# Patient Record
Sex: FEMALE | Race: WHITE | NOT HISPANIC OR LATINO | Employment: FULL TIME | URBAN - METROPOLITAN AREA
[De-identification: names, ages, dates, MRNs, and addresses within clinical notes are randomized per-mention and may not be internally consistent; named-entity substitution may affect disease eponyms.]

---

## 2024-02-19 SDOH — HEALTH STABILITY: PHYSICAL HEALTH: ON AVERAGE, HOW MANY MINUTES DO YOU ENGAGE IN EXERCISE AT THIS LEVEL?: 30 MIN

## 2024-02-19 SDOH — HEALTH STABILITY: PHYSICAL HEALTH: ON AVERAGE, HOW MANY DAYS PER WEEK DO YOU ENGAGE IN MODERATE TO STRENUOUS EXERCISE (LIKE A BRISK WALK)?: 3 DAYS

## 2024-02-19 ASSESSMENT — SOCIAL DETERMINANTS OF HEALTH (SDOH): HOW OFTEN DO YOU GET TOGETHER WITH FRIENDS OR RELATIVES?: TWICE A WEEK

## 2024-02-26 ENCOUNTER — OFFICE VISIT (OUTPATIENT)
Dept: FAMILY MEDICINE | Facility: CLINIC | Age: 24
End: 2024-02-26
Payer: COMMERCIAL

## 2024-02-26 ENCOUNTER — TELEPHONE (OUTPATIENT)
Dept: FAMILY MEDICINE | Facility: CLINIC | Age: 24
End: 2024-02-26

## 2024-02-26 VITALS
DIASTOLIC BLOOD PRESSURE: 68 MMHG | HEIGHT: 67 IN | OXYGEN SATURATION: 100 % | HEART RATE: 82 BPM | RESPIRATION RATE: 16 BRPM | TEMPERATURE: 97.6 F | WEIGHT: 119.4 LBS | BODY MASS INDEX: 18.74 KG/M2 | SYSTOLIC BLOOD PRESSURE: 108 MMHG

## 2024-02-26 DIAGNOSIS — Z00.00 ROUTINE GENERAL MEDICAL EXAMINATION AT A HEALTH CARE FACILITY: Primary | ICD-10-CM

## 2024-02-26 DIAGNOSIS — Z87.448 HISTORY OF PYELONEPHRITIS: ICD-10-CM

## 2024-02-26 DIAGNOSIS — N15.1 RENAL ABSCESS, RIGHT: ICD-10-CM

## 2024-02-26 DIAGNOSIS — Z11.59 NEED FOR HEPATITIS C SCREENING TEST: ICD-10-CM

## 2024-02-26 DIAGNOSIS — Z30.41 ENCOUNTER FOR BIRTH CONTROL PILLS MAINTENANCE: ICD-10-CM

## 2024-02-26 DIAGNOSIS — K64.4 EXTERNAL HEMORRHOIDS: ICD-10-CM

## 2024-02-26 PROBLEM — M25.562 CHRONIC PAIN OF BOTH KNEES: Status: ACTIVE | Noted: 2020-01-07

## 2024-02-26 PROBLEM — G89.29 CHRONIC PAIN OF BOTH KNEES: Status: ACTIVE | Noted: 2020-01-07

## 2024-02-26 PROBLEM — I73.00 RAYNAUD'S PHENOMENON WITHOUT GANGRENE: Status: ACTIVE | Noted: 2023-11-24

## 2024-02-26 PROBLEM — G43.109 MIGRAINE WITH AURA AND WITHOUT STATUS MIGRAINOSUS, NOT INTRACTABLE: Status: ACTIVE | Noted: 2024-02-26

## 2024-02-26 PROBLEM — M25.561 CHRONIC PAIN OF BOTH KNEES: Status: ACTIVE | Noted: 2020-01-07

## 2024-02-26 LAB
ALBUMIN UR-MCNC: NEGATIVE MG/DL
APPEARANCE UR: CLEAR
BACTERIA #/AREA URNS HPF: ABNORMAL /HPF
BILIRUB UR QL STRIP: NEGATIVE
COLOR UR AUTO: YELLOW
ERYTHROCYTE [DISTWIDTH] IN BLOOD BY AUTOMATED COUNT: 11.7 % (ref 10–15)
GLUCOSE UR STRIP-MCNC: NEGATIVE MG/DL
HCT VFR BLD AUTO: 41.7 % (ref 35–47)
HCV AB SERPL QL IA: NONREACTIVE
HGB BLD-MCNC: 13.5 G/DL (ref 11.7–15.7)
HGB UR QL STRIP: NEGATIVE
KETONES UR STRIP-MCNC: NEGATIVE MG/DL
LEUKOCYTE ESTERASE UR QL STRIP: NEGATIVE
MCH RBC QN AUTO: 31.9 PG (ref 26.5–33)
MCHC RBC AUTO-ENTMCNC: 32.4 G/DL (ref 31.5–36.5)
MCV RBC AUTO: 99 FL (ref 78–100)
MUCOUS THREADS #/AREA URNS LPF: PRESENT /LPF
NITRATE UR QL: NEGATIVE
PH UR STRIP: 6 [PH] (ref 5–7)
PLATELET # BLD AUTO: 289 10E3/UL (ref 150–450)
RBC # BLD AUTO: 4.23 10E6/UL (ref 3.8–5.2)
RBC #/AREA URNS AUTO: ABNORMAL /HPF
SP GR UR STRIP: 1.02 (ref 1–1.03)
SQUAMOUS #/AREA URNS AUTO: ABNORMAL /LPF
UROBILINOGEN UR STRIP-ACNC: 0.2 E.U./DL
WBC # BLD AUTO: 5.3 10E3/UL (ref 4–11)
WBC #/AREA URNS AUTO: ABNORMAL /HPF

## 2024-02-26 PROCEDURE — 36415 COLL VENOUS BLD VENIPUNCTURE: CPT | Performed by: PHYSICIAN ASSISTANT

## 2024-02-26 PROCEDURE — 86803 HEPATITIS C AB TEST: CPT | Performed by: PHYSICIAN ASSISTANT

## 2024-02-26 PROCEDURE — 81001 URINALYSIS AUTO W/SCOPE: CPT | Performed by: PHYSICIAN ASSISTANT

## 2024-02-26 PROCEDURE — 99385 PREV VISIT NEW AGE 18-39: CPT | Performed by: PHYSICIAN ASSISTANT

## 2024-02-26 PROCEDURE — 85027 COMPLETE CBC AUTOMATED: CPT | Performed by: PHYSICIAN ASSISTANT

## 2024-02-26 PROCEDURE — 99213 OFFICE O/P EST LOW 20 MIN: CPT | Mod: 25 | Performed by: PHYSICIAN ASSISTANT

## 2024-02-26 RX ORDER — ACETAMINOPHEN AND CODEINE PHOSPHATE 120; 12 MG/5ML; MG/5ML
0.35 SOLUTION ORAL
COMMUNITY
Start: 2022-09-19 | End: 2024-02-26

## 2024-02-26 RX ORDER — ACETAMINOPHEN AND CODEINE PHOSPHATE 120; 12 MG/5ML; MG/5ML
0.35 SOLUTION ORAL DAILY
Qty: 84 TABLET | Refills: 3 | Status: SHIPPED | OUTPATIENT
Start: 2024-02-26

## 2024-02-26 ASSESSMENT — PAIN SCALES - GENERAL: PAINLEVEL: MILD PAIN (2)

## 2024-02-26 NOTE — PROGRESS NOTES
Preventive Care Visit  [facility]  NICOLASA Barlow, Physician Assistant - Medical  Feb 26, 2024    Assessment & Plan     Routine general medical examination at a health care facility  Repeat 1 year     Need for hepatitis C screening test  - Hepatitis C Screen Reflex to HCV RNA Quant and Genotype; Future    Encounter for birth control pills maintenance  - norethindrone (MICRONOR) 0.35 MG tablet; Take 1 tablet (0.35 mg) by mouth daily    Renal abscess, right  Hx of renal abscess and pyelonephritis in 12/23 which resolved about 1 month ago on U/S however patient it having similar symptoms again. Advised labs and repeat U/S. Warning signs to return to ER educated to patient. Patient agree's with this plan and has no further questions  - UA with Microscopic reflex to Culture - lab collect; Future  - CBC with platelets; Future  - US Kidney Right; Future    History of pyelonephritis  See plan above  - UA with Microscopic reflex to Culture - lab collect; Future  - CBC with platelets; Future  - US Kidney Right; Future    External hemorrhoids  Conservative treatment recommended today. Advised follow up as needed    Patient has been advised of split billing requirements and indicates understanding: Yes  Review of prior external note(s) from - Outside records from [facility] - [place] and [place]        Counseling  Appropriate preventive services were discussed with this patient, including applicable screening as appropriate for fall prevention, nutrition, physical activity, Tobacco-use cessation, weight loss and cognition.  Checklist reviewing preventive services available has been given to the patient.  Reviewed patient's diet, addressing concerns and/or questions.   She is at risk for lack of exercise and has been provided with information to increase physical activity for the benefit of her well-being.           Natasha Means is a 23 year old, presenting for the  following:  Physical       Via the Health Maintenance questionnaire, the patient has reported the following services have been completed -Chlamydia-Cervical Cancer Screening, this information has been sent to the abstraction team.  Health Care Directive  Patient does not have a Health Care Directive or Living Will: Discussed advance care planning with patient; however, patient declined at this time.    HPI  Kidney infection - Hx hospitalized for a kidney infection and renal abscess on the right kidney at the end of 2023. She had  antibiotic for 3 weeks, Levofloxacin. U/S was repeated to ensure the abscessed resolved on antibiotics. It did but now patient is having similar pain in the right side. No urinary symptoms. No fevers, chills, or body aches.    Pain with stools -  symptoms started a few weeks ago. With every bowel movement has pain. She hasn't been going as often. More constipated and harder stools. No blood in stool.          2/19/2024   General Health   How would you rate your overall physical health? Good   Feel stress (tense, anxious, or unable to sleep) To some extent   (!) STRESS CONCERN      2/19/2024   Nutrition   Three or more servings of calcium each day? Yes   Diet: Regular (no restrictions)   How many servings of fruit and vegetables per day? (!) 2-3   How many sweetened beverages each day? 0-1         2/19/2024   Exercise   Days per week of moderate/strenous exercise 3 days   Average minutes spent exercising at this level 30 min         2/19/2024   Social Factors   Frequency of gathering with friends or relatives Twice a week   Worry food won't last until get money to buy more No   Food not last or not have enough money for food? No   Do you have housing?  Yes   Are you worried about losing your housing? No   Lack of transportation? No   Unable to get utilities (heat,electricity)? No         2/19/2024   Dental   Dentist two times every year? Yes         2/19/2024   TB Screening   Were you born  "outside of [place]?  (!) YES             Today's PHQ-2 Score:       2/26/2024     6:49 AM   PHQ-2 ( 1999 [employer])   Q1: Little interest or pleasure in doing things 0   Q2: Feeling down, depressed or hopeless 0   PHQ-2 Score 0   Q1: Little interest or pleasure in doing things Not at all   Q2: Feeling down, depressed or hopeless Not at all   PHQ-2 Score 0         2/19/2024   Substance Use   Alcohol more than 3/day or more than 7/wk No   Do you use any other substances recreationally? No     Social History     Tobacco Use    Smoking status: Never     Passive exposure: Never    Smokeless tobacco: Never   Vaping Use    Vaping Use: Never used   Substance Use Topics    Alcohol use: Yes    Drug use: Never             2/19/2024   One time HIV Screening   Previous HIV test? Yes         2/19/2024   STI Screening   New sexual partner(s) since last STI/HIV test? No     History of abnormal Pap smear: NO - age 21-29 PAP every 3 years recommended             2/19/2024   Contraception/Family Planning   Questions about contraception or family planning No        Reviewed and updated as needed this visit by Provider   Tobacco   Meds  Problems    Fam Hx  Soc Hx Sexual Activity                Review of Systems  Constitutional, HEENT, cardiovascular, pulmonary, GI, , musculoskeletal, neuro, skin, endocrine and psych systems are negative, except as otherwise noted.     Objective    Exam  /68   Pulse 82   Temp 97.6  F (36.4  C) (Oral)   Resp 16   Ht 1.69 m (5' 6.54\")   Wt 54.2 kg (119 lb 6.4 oz)   LMP 02/17/2024 (Approximate)   SpO2 100%   BMI 18.96 kg/m     Estimated body mass index is 18.96 kg/m  as calculated from the following:    Height as of this encounter: 1.69 m (5' 6.54\").    Weight as of this encounter: 54.2 kg (119 lb 6.4 oz).    Physical Exam  GENERAL: alert and no distress  EYES: Eyes grossly normal to inspection, PERRL and conjunctivae and sclerae normal  HENT: ear canals and TM's normal, nose and mouth without " ulcers or lesions  NECK: no adenopathy, no asymmetry, masses, or scars  RESP: lungs clear to auscultation - no rales, rhonchi or wheezes  CV: regular rate and rhythm, normal S1 S2, no S3 or S4, no murmur, click or rub, no peripheral edema  ABDOMEN: soft, nontender, no hepatosplenomegaly, no masses and bowel sounds normal, no CVA tenderness  RECTAL: approximate 2-3 mm hemorrhoid  MS: no gross musculoskeletal defects noted, no edema  SKIN: no suspicious lesions or rashes  NEURO: Normal strength and tone, mentation intact and speech normal  PSYCH: mentation appears normal, affect normal/bright      Signed Electronically by: NICOLASA Barlow

## 2024-02-26 NOTE — TELEPHONE ENCOUNTER
Patient calling because she noticed some of her UA results came back out of the normal range.     She would prefer a phone call back regarding this.     Please review and advise.     Miladis Ribeiro RN BSN  [facility]

## 2024-02-26 NOTE — PATIENT INSTRUCTIONS
Preventive Care Advice   This is general advice given by our system to help you stay healthy. However, your care team may have specific advice just for you. Please talk to your care team about your preventive care needs.  Nutrition  Eat 5 or more servings of fruits and vegetables each day.  Try wheat bread, brown rice and whole grain pasta (instead of white bread, rice, and pasta).  Get enough calcium and vitamin D. Check the label on foods and aim for 100% of the RDA (recommended daily allowance).  Lifestyle  Exercise at least 150 minutes each week  (30 minutes a day, 5 days a week).  Do muscle strengthening activities 2 days a week. These help control your weight and prevent disease.  No smoking.  Wear sunscreen to prevent skin cancer.  Have a dental exam and cleaning every 6 months.  Yearly exams  See your health care team every year to talk about:  Any changes in your health.  Any medicines your care team has prescribed.  Preventive care, family planning, and ways to prevent chronic diseases.  Shots (vaccines)   HPV shots (up to age 26), if you've never had them before.  Hepatitis B shots (up to age 59), if you've never had them before.  COVID-19 shot: Get this shot when it's due.  Flu shot: Get a flu shot every year.  Tetanus shot: Get a tetanus shot every 10 years.  Pneumococcal, hepatitis A, and RSV shots: Ask your care team if you need these based on your risk.  Shingles shot (for age 50 and up)  General health tests  Diabetes screening:  Starting at age 35, Get screened for diabetes at least every 3 years.  If you are younger than age 35, ask your care team if you should be screened for diabetes.  Cholesterol test: At age 39, start having a cholesterol test every 5 years, or more often if advised.  Bone density scan (DEXA): At age 50, ask your care team if you should have this scan for osteoporosis (brittle bones).  Hepatitis C: Get tested at least once in your life.  STIs (sexually transmitted  infections)  Before age 24: Ask your care team if you should be screened for STIs.  After age 24: Get screened for STIs if you're at risk. You are at risk for STIs (including HIV) if:  You are sexually active with more than one person.  You don't use condoms every time.  You or a partner was diagnosed with a sexually transmitted infection.  If you are at risk for HIV, ask about PrEP medicine to prevent HIV.  Get tested for HIV at least once in your life, whether you are at risk for HIV or not.  Cancer screening tests  Cervical cancer screening: If you have a cervix, begin getting regular cervical cancer screening tests starting at age 21.  Breast cancer scan (mammogram): If you've ever had breasts, begin having regular mammograms starting at age 40. This is a scan to check for breast cancer.  Colon cancer screening: It is important to start screening for colon cancer at age 45.  Have a colonoscopy test every 10 years (or more often if you're at risk) Or, ask your provider about stool tests like a FIT test every year or Cologuard test every 3 years.  To learn more about your testing options, visit:   https://www.[employer]/360135.pdf.  For help making a decision, visit:   https://bit.ly/kk94333.  Prostate cancer screening test: If you have a prostate, ask your care team if a prostate cancer screening test (PSA) at age 55 is right for you.  Lung cancer screening: If you are a current or former smoker ages 50 to 80, ask your care team if ongoing lung cancer screenings are right for you.  For informational purposes only. Not to replace the advice of your health care provider. Copyright   2023 [facility]. All rights reserved. Clinically reviewed by the [facility]. [employer] 487006 - REV 01/24.    Eating Healthy Foods: Care Instructions  With every meal, you can make healthy food choices. Try to eat a variety of fruits, vegetables, whole grains, lean proteins, and low-fat dairy  "products. This can help you get the right balance of nutrients, including vitamins and minerals. Small changes add up over time. You can start by adding one healthy food to your meals each day.    Try to make half your plate fruits and vegetables, one-fourth whole grains, and one-fourth lean proteins. Try including dairy with your meals.   Eat more fruits and vegetables. Try to have them with most meals and snacks.   Foods for healthy eating    Fruits    These can be fresh, frozen, canned, or dried.  Try to choose whole fruit rather than fruit juice.  Eat a variety of colors.    Vegetables    These can be fresh, frozen, canned, or dried.  Beans, peas, and lentils count too.    Whole grains    Choose whole-grain breads, cereals, and noodles.  Try brown rice.    Lean proteins    These can include lean meat, poultry, fish, and eggs.  You can also have tofu, beans, peas, lentils, nuts, and seeds.    Dairy    Try milk, yogurt, and cheese.  Choose low-fat or fat-free when you can.  If you need to, use lactose-free milk or fortified plant-based milk products, such as soy milk.    Water    Drink water when you're thirsty.  Limit sugar-sweetened drinks, including soda, fruit drinks, and sports drinks.  Where can you learn more?  Go to https://www.[employer].net/patiented  Enter T756 in the search box to learn more about \"Eating Healthy Foods: Care Instructions.\"  Current as of: February 28, 2023               Content Version: 13.8    8598-0736 [employer].   Care instructions adapted under license by your healthcare professional. If you have questions about a medical condition or this instruction, always ask your healthcare professional. Healthwise, [employer] disclaims any warranty or liability for your use of this information.      "

## 2024-02-27 NOTE — TELEPHONE ENCOUNTER
Patient returned call to clinic.    Relayed provider message.    Patient stating she is still having some side pain and if she should keep the US scheduled for tomorrow.    Advised to keep that appointment and to seek care in the ER with any worsening symptoms as she was instructed to do by provider yesterday.    Patient stated understanding of all information.    Christine M Klisch, RN

## 2024-02-27 NOTE — TELEPHONE ENCOUNTER
RN left  for patient requesting call back to clinic.      RN called to relay providers message.    Barron Buckner RN, BSN, PHN  [facility]

## 2024-02-27 NOTE — TELEPHONE ENCOUNTER
Please let her know that the urine shows no sign of bacteria or blood. The microscopic reflex  shows a few bacteria, squamous cells and mucus. These are all normal pathology to find in urine.    Thank you,  Linda Veliz PA-C

## 2024-02-28 ENCOUNTER — ANCILLARY PROCEDURE (OUTPATIENT)
Dept: ULTRASOUND IMAGING | Facility: CLINIC | Age: 24
End: 2024-02-28
Attending: PHYSICIAN ASSISTANT
Payer: COMMERCIAL

## 2024-02-28 DIAGNOSIS — Z87.448 HISTORY OF PYELONEPHRITIS: ICD-10-CM

## 2024-02-28 DIAGNOSIS — N15.1 RENAL ABSCESS, RIGHT: ICD-10-CM

## 2024-02-28 PROCEDURE — 76770 US EXAM ABDO BACK WALL COMP: CPT | Mod: GC | Performed by: STUDENT IN AN ORGANIZED HEALTH CARE EDUCATION/TRAINING PROGRAM

## 2024-02-29 ENCOUNTER — TELEPHONE (OUTPATIENT)
Dept: FAMILY MEDICINE | Facility: CLINIC | Age: 24
End: 2024-02-29
Payer: COMMERCIAL

## 2024-02-29 DIAGNOSIS — N15.1 RENAL ABSCESS, RIGHT: Primary | ICD-10-CM

## 2024-02-29 DIAGNOSIS — N28.1 RENAL CYST: ICD-10-CM

## 2024-02-29 NOTE — TELEPHONE ENCOUNTER
Patient calling to follow up on yesterday's renal US.   Wonders if she need to do anything, go to ER, or what.    I don't see result note yet attached to the US.    Routed to provider who ordered test and who saw patient for routine visit on 2/26/24 to address.    Angela CRAIG RN  [facility] Triage

## 2024-02-29 NOTE — TELEPHONE ENCOUNTER
It appears that she continues to have the a cyst of unknown determines of the right kidney and in addition a few cysts of the left kidney. I would like her to see Nephrology to discuss these results and to determine next steps in management and treatment. Referral placed.    Thank you,  Linda Veliz PA-C

## 2024-02-29 NOTE — TELEPHONE ENCOUNTER
RN called and spoke with patient.    RN reviewed providers message.    RN gave contact information to schedule.    Patient verbalized understanding.    Barron Buckner RN, BSN, PHN  [facility]

## 2024-02-29 NOTE — TELEPHONE ENCOUNTER
----- Message from NICOLASA Barlow sent at 2/29/2024  2:57 PM CST -----  Please let patient know that I can to resend referral to a Urologist instead of nephrology.     Thank you,  Linda Veliz PA-C

## 2024-02-29 NOTE — TELEPHONE ENCOUNTER
RN called and spoke with patient     RN gave number to Urology.      Barron Buckner, RN, BSN, PHN  [facility]

## 2024-03-01 ENCOUNTER — TELEPHONE (OUTPATIENT)
Dept: NEPHROLOGY | Facility: CLINIC | Age: 24
End: 2024-03-01
Payer: COMMERCIAL

## 2024-03-01 NOTE — TELEPHONE ENCOUNTER
Called pt and lvm about referral placed by NICOLASA Spear. Upon review, Neph referral is not needed and pt should be seen by Urology at this time as she also has referral for Urology. Per Joanna MARTINEZ RN.  Liliana Choudhury,  Nephrology Clinic Navigator  [facility]  Direct: [phone].

## 2024-03-01 NOTE — TELEPHONE ENCOUNTER
Action 2024 JTV 11:44 AM    Action Taken CSS sent an urgent request to [facility] for images.     TRACKIN     Action 2024 jtv 2:21 PM    Action Taken iMAGES were received and are in PACS.    MEDICAL RECORDS REQUEST   [facility]  Urology Clinic  [address] [place] [address]  PHONE: [phone]  Fax: [phone]        FUTURE VISIT INFORMATION                                                   Clary Nuvia Sigala, : 2000 scheduled for future visit at [facility]    APPOINTMENT INFORMATION:  Date: 2024  Provider:  Charlie Bright MD  Reason for Visit/Diagnosis: renal cyst    REFERRAL INFORMATION:  Referring provider:  Linda Veliz PA in [facility]      RECORDS REQUESTED FOR VISIT                                                     NOTES  STATUS/DETAILS   OFFICE NOTE from referring provider  yes, 2024 -- Linda Veliz PA in [facility]   OFFICE NOTE from other specialist  YES, 2023 -- Diego Arvizu MD @ [facility]   MEDICATION LIST  yes   LABS     URINALYSIS (UA)  yes   IMAGES        yes, 2024 -- US RENAL    [facility]  2024, 2023 -- US KIDNEY  2023 -- CT ABD PELVIS      PRE-VISIT CHECKLIST      Joint diagnostic appointment coordinated correctly          (ensure right order & amount of time) Yes   RECORD COLLECTION COMPLETE yes

## 2024-03-04 ENCOUNTER — PRE VISIT (OUTPATIENT)
Dept: UROLOGY | Facility: CLINIC | Age: 24
End: 2024-03-04
Payer: COMMERCIAL

## 2024-03-04 NOTE — CONFIDENTIAL NOTE
Reason for visit: consult     Relevant information: renal cyst    Records/imaging/labs/orders: in epic    At Rooming: kimberley Dodson  3/4/2024  3:14 PM

## 2024-03-06 ENCOUNTER — PRE VISIT (OUTPATIENT)
Dept: UROLOGY | Facility: CLINIC | Age: 24
End: 2024-03-06

## 2024-03-06 ENCOUNTER — OFFICE VISIT (OUTPATIENT)
Dept: UROLOGY | Facility: CLINIC | Age: 24
End: 2024-03-06
Attending: PHYSICIAN ASSISTANT
Payer: COMMERCIAL

## 2024-03-06 VITALS
HEART RATE: 80 BPM | HEIGHT: 66 IN | WEIGHT: 120 LBS | DIASTOLIC BLOOD PRESSURE: 69 MMHG | BODY MASS INDEX: 19.29 KG/M2 | SYSTOLIC BLOOD PRESSURE: 105 MMHG

## 2024-03-06 DIAGNOSIS — N15.1 RENAL ABSCESS, RIGHT: ICD-10-CM

## 2024-03-06 DIAGNOSIS — N28.1 RENAL CYST: ICD-10-CM

## 2024-03-06 PROCEDURE — 99204 OFFICE O/P NEW MOD 45 MIN: CPT | Performed by: UROLOGY

## 2024-03-06 ASSESSMENT — PAIN SCALES - GENERAL: PAINLEVEL: NO PAIN (1)

## 2024-03-06 NOTE — NURSING NOTE
"Chief Complaint   Patient presents with    Consult     Imaging follow up kidney mass, kidney cyst       Blood pressure 105/69, pulse 80, height 1.676 m (5' 6\"), weight 54.4 kg (120 lb), last menstrual period 02/17/2024. Body mass index is 19.37 kg/m .    Patient Active Problem List   Diagnosis    Allergic rhinitis    Chronic pain of both knees    Raynaud's phenomenon without gangrene    Snapping hip syndrome    Migraine with aura and without status migrainosus, not intractable       Allergies   Allergen Reactions    Cat Hair Extract Hives, Itching and Rash       Current Outpatient Medications   Medication Sig Dispense Refill    norethindrone (MICRONOR) 0.35 MG tablet Take 1 tablet (0.35 mg) by mouth daily 84 tablet 3       Social History     Tobacco Use    Smoking status: Never     Passive exposure: Never    Smokeless tobacco: Never   Vaping Use    Vaping Use: Never used   Substance Use Topics    Alcohol use: Yes    Drug use: Never       Idris Gonzalez  3/6/2024  7:55 AM     "

## 2024-03-06 NOTE — LETTER
3/6/2024       RE: Clary Castro [address]  [facility] [address]     Dear Colleague,    Thank you for referring your patient, Clary Sigala, to the [facility]OLOG[facility] [place] [facility]. Please see a copy of my visit note below.    Urology Clinic             Chief Complaint:   Bilateral flank pain  Bilateral renal cyst           Consult or Referral:     Clary Sigala is a 23 year old female seen in consultation.         History of Present Illness:    Clary Sigala is a very pleasant 23 year old female who initially presented with right flank pain and fever in December of last year and was diagnosed with right renal abscess.  The abscess was measured at 3.1 cm.  Decision was made at the time not to drain it and treated with 3 weeks of antibiotics.  Her symptoms completely resolved up until 3 weeks ago when she started having bilateral flank discomfort specially  after drinking water.  She denies any significant lower urine tract symptoms, gross hematuria, or fever.      she does not have a history of previous abdominal or retroperitoneal surgery.  There is not a family history of renal cell cancer.  The patient does not have a history of smoking and currently is not smoking.    ECOG Performance Score: 0 - Independent           Past Medical History:   No past medical history on file.         Past Surgical History:   No past surgical history on file.         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   MRI hip arthrogram R (5/6/14) ---  Small anterosuperior labral tear on R, borderline acetabular retroversion   -- Ultrasound L hip (4/30/15) ---      Allergic rhinitis 11/06/2010     Priority: Medium     Formatting of this note might be different from the original.   Spring allergies treated with claritin and fluticasone              Medications     Current Outpatient Medications   Medication    norethindrone (MICRONOR) 0.35 MG tablet     No current facility-administered medications for this visit.            Family History:     Family History   Adopted: Yes            Social History:     Social History     Socioeconomic History    Marital status: Single     Spouse name: Not on file    Number of children: Not on file    Years of education: Not on file    Highest education level: Not on file   Occupational History    Not on file   Tobacco Use    Smoking status: Never     Passive exposure: Never    Smokeless tobacco: Never   Vaping Use    Vaping Use: Never used   Substance and Sexual Activity    Alcohol use: Yes    Drug use: Never    Sexual activity: Yes     Partners: Male     Birth control/protection: Pill   Other Topics Concern    Not on file   Social History Narrative    Not on file     Social Determinants of Health     Financial Resource Strain: Low Risk  (2/19/2024)    Financial Resource Strain     Within the past 12 months, have you or your family members you live with been unable to get utilities (heat, electricity) when it was really needed?: No   Food Insecurity: Low Risk  (2/19/2024)    Food Insecurity     Within the past 12 months, did you worry that your food would run out before you got money to buy more?: No     Within the past 12 months, did the food you bought just not last and you didn t have money to get more?: No   Transportation Needs: Low Risk  (2/19/2024)    Transportation Needs     Within the past 12 months, has lack of transportation kept you from medical appointments, getting your medicines, non-medical meetings or  "appointments, work, or from getting things that you need?: No   Physical Activity: Insufficiently Active (2/19/2024)    Exercise Vital Sign     Days of Exercise per Week: 3 days     Minutes of Exercise per Session: 30 min   Stress: Stress Concern Present (2/19/2024)    [employer] [employer] - Occupational Stress Questionnaire     Feeling of Stress : To some extent   Social Connections: Unknown (2/19/2024)    Social Connection and Isolation Panel [NHANES]     Frequency of Communication with Friends and Family: Not on file     Frequency of Social Gatherings with Friends and Family: Twice a week     Attends Sikhism Services: Not on file     Active Member of Clubs or Organizations: Not on file     Attends Club or Organization Meetings: Not on file     Marital Status: Not on file   Interpersonal Safety: Low Risk  (2/26/2024)    Interpersonal Safety     Do you feel physically and emotionally safe where you currently live?: Yes     Within the past 12 months, have you been hit, slapped, kicked or otherwise physically hurt by someone?: No     Within the past 12 months, have you been humiliated or emotionally abused in other ways by your partner or ex-partner?: No   Housing Stability: Low Risk  (2/19/2024)    Housing Stability     Do you have housing? : Yes     Are you worried about losing your housing?: No            Allergies:   Cat hair extract         Review of Systems:  From intake questionnaire     Negative 14 system review except as noted on HPI, nurse's note see below.         Physical Exam:     Patient is a 23 year old  female Body mass index is 19.37 kg/m .  Constitutional: Vitals: Blood pressure 105/69, pulse 80, height 1.676 m (5' 6\"), weight 54.4 kg (120 lb), last menstrual period 02/17/2024.  General Appearance Adult: Alert, no acute distress, oriented  HENT: throat/mouth:normal, good dentition  Neck: No adenopathy,masses or thyromegaly  Lungs/Repiratory: no respiratory distress, or pursed " "lip breathing  Heart: No obvious jugular venous distension present, normal heart rate  Abdomen: soft, nontender, no organomegaly or masses  Hematologic/Lymphatics: No cervical or supraclavicular adenopathy  Musculoskeltal: extremities normal, no peripheral edema  Skin: no noted suspicious lesions or rashes  Neuro: Alert, oriented, speech and mentation normal  Psych: affect and mood normal  Gait: Normal without assistance  : deferred          Labs and Pathology:    I personally reviewed all applicable laboratory and pathology data reviewed with patient    Lab Results   Component Value Date    WBC 5.3 02/26/2024     Lab Results   Component Value Date    RBC 4.23 02/26/2024     Lab Results   Component Value Date    HGB 13.5 02/26/2024     Lab Results   Component Value Date    HCT 41.7 02/26/2024     No components found for: \"MCT\"  Lab Results   Component Value Date    MCV 99 02/26/2024     Lab Results   Component Value Date    MCH 31.9 02/26/2024     Lab Results   Component Value Date    MCHC 32.4 02/26/2024     Lab Results   Component Value Date    RDW 11.7 02/26/2024     Lab Results   Component Value Date     02/26/2024         Imaging:    I personally viewed all applicable imaging and interpreted them and went over the results with the patient.  Mass/lesion details are as follows           Assessment and Plan:     Assessment:  23-year-old female with bilateral flank pain and small hypoechoic renal cyst    I reviewed the images carefully with her and also reviewed her most recent CT scan.  I do not see any evidence of any residual renal abscess and the renal cysts appear benign and not concerning.  I told her that it is highly unusual for these renal cyst to cause any pain.  I told her that I do not have any good explanation about the reason that she is having bilateral flank pain after drinking water.  I advised her to ask her physician assistant to refer her for a nephrology visit if she continues to have " bilateral flank pain.  I would also recommend a repeat CT abdomen pelvis if she continues to have bilateral flank pain in the next few months            Plan:  No urological intervention is needed  Return to clinic as needed      45 total minutes spent on the date of the encounter including direct interaction with the patient, performing chart review, documentation and further activities as noted above      Charlie Bright MD   Department of Urology   [facility]

## 2024-03-06 NOTE — PROGRESS NOTES
Urology Clinic             Chief Complaint:   Bilateral flank pain  Bilateral renal cyst           Consult or Referral:     Clary Sigala is a 23 year old female seen in consultation.         History of Present Illness:    Clary Sigala is a very pleasant 23 year old female who initially presented with right flank pain and fever in December of last year and was diagnosed with right renal abscess.  The abscess was measured at 3.1 cm.  Decision was made at the time not to drain it and treated with 3 weeks of antibiotics.  Her symptoms completely resolved up until 3 weeks ago when she started having bilateral flank discomfort specially  after drinking water.  She denies any significant lower urine tract symptoms, gross hematuria, or fever.      she does not have a history of previous abdominal or retroperitoneal surgery.  There is not a family history of renal cell cancer.  The patient does not have a history of smoking and currently is not smoking.    ECOG Performance Score: 0 - Independent           Past Medical History:   No past medical history on file.         Past Surgical History:   No past surgical history on file.         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   MRI hip arthrogram R (5/6/14) --- Small anterosuperior labral tear on R, borderline acetabular retroversion   -- Ultrasound L hip (4/30/15) ---      Allergic rhinitis 11/06/2010     Priority: Medium     Formatting of this note might be different from the original.   Spring allergies treated with claritin and fluticasone              Medications     Current  Outpatient Medications   Medication    norethindrone (MICRONOR) 0.35 MG tablet     No current facility-administered medications for this visit.            Family History:     Family History   Adopted: Yes            Social History:     Social History     Socioeconomic History    Marital status: Single     Spouse name: Not on file    Number of children: Not on file    Years of education: Not on file    Highest education level: Not on file   Occupational History    Not on file   Tobacco Use    Smoking status: Never     Passive exposure: Never    Smokeless tobacco: Never   Vaping Use    Vaping Use: Never used   Substance and Sexual Activity    Alcohol use: Yes    Drug use: Never    Sexual activity: Yes     Partners: Male     Birth control/protection: Pill   Other Topics Concern    Not on file   Social History Narrative    Not on file     Social Determinants of Health     Financial Resource Strain: Low Risk  (2/19/2024)    Financial Resource Strain     Within the past 12 months, have you or your family members you live with been unable to get utilities (heat, electricity) when it was really needed?: No   Food Insecurity: Low Risk  (2/19/2024)    Food Insecurity     Within the past 12 months, did you worry that your food would run out before you got money to buy more?: No     Within the past 12 months, did the food you bought just not last and you didn t have money to get more?: No   Transportation Needs: Low Risk  (2/19/2024)    Transportation Needs     Within the past 12 months, has lack of transportation kept you from medical appointments, getting your medicines, non-medical meetings or appointments, work, or from getting things that you need?: No   Physical Activity: Insufficiently Active (2/19/2024)    Exercise Vital Sign     Days of Exercise per Week: 3 days     Minutes of Exercise per Session: 30 min   Stress: Stress Concern Present (2/19/2024)    [employer] [place] [employer] - Occupational Stress  "Questionnaire     Feeling of Stress : To some extent   Social Connections: Unknown (2/19/2024)    Social Connection and Isolation Panel [NHANES]     Frequency of Communication with Friends and Family: Not on file     Frequency of Social Gatherings with Friends and Family: Twice a week     Attends Restoration Services: Not on file     Active Member of Clubs or Organizations: Not on file     Attends Club or Organization Meetings: Not on file     Marital Status: Not on file   Interpersonal Safety: Low Risk  (2/26/2024)    Interpersonal Safety     Do you feel physically and emotionally safe where you currently live?: Yes     Within the past 12 months, have you been hit, slapped, kicked or otherwise physically hurt by someone?: No     Within the past 12 months, have you been humiliated or emotionally abused in other ways by your partner or ex-partner?: No   Housing Stability: Low Risk  (2/19/2024)    Housing Stability     Do you have housing? : Yes     Are you worried about losing your housing?: No            Allergies:   Cat hair extract         Review of Systems:  From intake questionnaire     Negative 14 system review except as noted on HPI, nurse's note see below.         Physical Exam:     Patient is a 23 year old  female Body mass index is 19.37 kg/m .  Constitutional: Vitals: Blood pressure 105/69, pulse 80, height 1.676 m (5' 6\"), weight 54.4 kg (120 lb), last menstrual period 02/17/2024.  General Appearance Adult: Alert, no acute distress, oriented  HENT: throat/mouth:normal, good dentition  Neck: No adenopathy,masses or thyromegaly  Lungs/Repiratory: no respiratory distress, or pursed lip breathing  Heart: No obvious jugular venous distension present, normal heart rate  Abdomen: soft, nontender, no organomegaly or masses  Hematologic/Lymphatics: No cervical or supraclavicular adenopathy  Musculoskeltal: extremities normal, no peripheral edema  Skin: no noted suspicious lesions or rashes  Neuro: Alert, oriented, " "speech and mentation normal  Psych: affect and mood normal  Gait: Normal without assistance  : deferred          Labs and Pathology:    I personally reviewed all applicable laboratory and pathology data reviewed with patient    Lab Results   Component Value Date    WBC 5.3 02/26/2024     Lab Results   Component Value Date    RBC 4.23 02/26/2024     Lab Results   Component Value Date    HGB 13.5 02/26/2024     Lab Results   Component Value Date    HCT 41.7 02/26/2024     No components found for: \"MCT\"  Lab Results   Component Value Date    MCV 99 02/26/2024     Lab Results   Component Value Date    MCH 31.9 02/26/2024     Lab Results   Component Value Date    MCHC 32.4 02/26/2024     Lab Results   Component Value Date    RDW 11.7 02/26/2024     Lab Results   Component Value Date     02/26/2024         Imaging:    I personally viewed all applicable imaging and interpreted them and went over the results with the patient.  Mass/lesion details are as follows           Assessment and Plan:     Assessment:  23-year-old female with bilateral flank pain and small hypoechoic renal cyst    I reviewed the images carefully with her and also reviewed her most recent CT scan.  I do not see any evidence of any residual renal abscess and the renal cysts appear benign and not concerning.  I told her that it is highly unusual for these renal cyst to cause any pain.  I told her that I do not have any good explanation about the reason that she is having bilateral flank pain after drinking water.  I advised her to ask her physician assistant to refer her for a nephrology visit if she continues to have bilateral flank pain.  I would also recommend a repeat CT abdomen pelvis if she continues to have bilateral flank pain in the next few months            Plan:  No urological intervention is needed  Return to clinic as needed      45 total minutes spent on the date of the encounter including direct interaction with the patient, " performing chart review, documentation and further activities as noted above      Charlie Bright MD   Department of Urology   [facility]

## 2024-07-19 ENCOUNTER — OFFICE VISIT (OUTPATIENT)
Dept: FAMILY MEDICINE | Facility: CLINIC | Age: 24
End: 2024-07-19
Payer: COMMERCIAL

## 2024-07-19 VITALS
RESPIRATION RATE: 16 BRPM | WEIGHT: 116.8 LBS | DIASTOLIC BLOOD PRESSURE: 68 MMHG | BODY MASS INDEX: 18.77 KG/M2 | TEMPERATURE: 97.3 F | HEIGHT: 66 IN | OXYGEN SATURATION: 100 % | HEART RATE: 62 BPM | SYSTOLIC BLOOD PRESSURE: 99 MMHG

## 2024-07-19 DIAGNOSIS — K59.00 CONSTIPATION, UNSPECIFIED CONSTIPATION TYPE: Primary | ICD-10-CM

## 2024-07-19 DIAGNOSIS — K58.1 IRRITABLE BOWEL SYNDROME WITH CONSTIPATION: ICD-10-CM

## 2024-07-19 DIAGNOSIS — K64.8 INTERNAL HEMORRHOID: ICD-10-CM

## 2024-07-19 LAB
ALBUMIN SERPL BCG-MCNC: 4.6 G/DL (ref 3.5–5.2)
ALP SERPL-CCNC: 54 U/L (ref 40–150)
ALT SERPL W P-5'-P-CCNC: 10 U/L (ref 0–50)
ANION GAP SERPL CALCULATED.3IONS-SCNC: 11 MMOL/L (ref 7–15)
AST SERPL W P-5'-P-CCNC: 21 U/L (ref 0–45)
BILIRUB SERPL-MCNC: 0.8 MG/DL
BUN SERPL-MCNC: 12 MG/DL (ref 6–20)
CALCIUM SERPL-MCNC: 9.3 MG/DL (ref 8.8–10.4)
CHLORIDE SERPL-SCNC: 107 MMOL/L (ref 98–107)
CREAT SERPL-MCNC: 0.76 MG/DL (ref 0.51–0.95)
EGFRCR SERPLBLD CKD-EPI 2021: >90 ML/MIN/1.73M2
ERYTHROCYTE [DISTWIDTH] IN BLOOD BY AUTOMATED COUNT: 12 % (ref 10–15)
GLUCOSE SERPL-MCNC: 89 MG/DL (ref 70–99)
HCO3 SERPL-SCNC: 24 MMOL/L (ref 22–29)
HCT VFR BLD AUTO: 39 % (ref 35–47)
HGB BLD-MCNC: 13 G/DL (ref 11.7–15.7)
MCH RBC QN AUTO: 32.6 PG (ref 26.5–33)
MCHC RBC AUTO-ENTMCNC: 33.3 G/DL (ref 31.5–36.5)
MCV RBC AUTO: 98 FL (ref 78–100)
PLATELET # BLD AUTO: 271 10E3/UL (ref 150–450)
POTASSIUM SERPL-SCNC: 4.1 MMOL/L (ref 3.4–5.3)
PROT SERPL-MCNC: 7.1 G/DL (ref 6.4–8.3)
RBC # BLD AUTO: 3.99 10E6/UL (ref 3.8–5.2)
SODIUM SERPL-SCNC: 142 MMOL/L (ref 135–145)
TSH SERPL DL<=0.005 MIU/L-ACNC: 1.23 UIU/ML (ref 0.3–4.2)
WBC # BLD AUTO: 5.6 10E3/UL (ref 4–11)

## 2024-07-19 PROCEDURE — 86231 EMA EACH IG CLASS: CPT | Mod: 90 | Performed by: FAMILY MEDICINE

## 2024-07-19 PROCEDURE — 99204 OFFICE O/P NEW MOD 45 MIN: CPT | Performed by: FAMILY MEDICINE

## 2024-07-19 PROCEDURE — 99000 SPECIMEN HANDLING OFFICE-LAB: CPT | Performed by: FAMILY MEDICINE

## 2024-07-19 PROCEDURE — 84443 ASSAY THYROID STIM HORMONE: CPT | Performed by: FAMILY MEDICINE

## 2024-07-19 PROCEDURE — 85027 COMPLETE CBC AUTOMATED: CPT | Performed by: FAMILY MEDICINE

## 2024-07-19 PROCEDURE — 80053 COMPREHEN METABOLIC PANEL: CPT | Performed by: FAMILY MEDICINE

## 2024-07-19 PROCEDURE — 36415 COLL VENOUS BLD VENIPUNCTURE: CPT | Performed by: FAMILY MEDICINE

## 2024-07-19 PROCEDURE — 86364 TISS TRNSGLTMNASE EA IG CLAS: CPT | Performed by: FAMILY MEDICINE

## 2024-07-19 ASSESSMENT — PAIN SCALES - GENERAL: PAINLEVEL: NO PAIN (0)

## 2024-07-19 NOTE — PROGRESS NOTES
Assessment & Plan     Second ferny Means was seen today for gastrointestinal problem.    Diagnoses and all orders for this visit:    Constipation, unspecified constipation type  Patient presents to clinic for evaluation of chronic abdominal discomfort and constipation. Constipation is getting worse. Patient denies any blood in the stool denies any weight changes.   I recommended proceeding with comprehensive labs. Return to clinic for a follow-up visit if symptoms persisted. I would recommend proceeding with a colonoscopy or other abdominal imagining.   -     Comprehensive metabolic panel (BMP + Alb, Alk Phos, ALT, AST, Total. Bili, TP); Future  -     CBC with platelets; Future  -     Fecal Lactoferrin; Future  -     TSH with free T4 reflex; Future  -     Tissue transglutaminase teresa IgA and IgG [[id number]]; Future  -     Endomysial Antibody IgA by IFA [[id number]]; Future  -     linaclotide (LINZESS) 72 MCG capsule; Take 1 capsule (72 mcg) by mouth every morning (before breakfast)    Internal hemorrhoid  -     linaclotide (LINZESS) 72 MCG capsule; Take 1 capsule (72 mcg) by mouth every morning (before breakfast)    Irritable bowel syndrome with constipation  -     linaclotide (LINZESS) 72 MCG capsule; Take 1 capsule (72 mcg) by mouth every morning (before breakfast)    32 minutes spent by me on the date of the encounter doing chart review, history and exam, documentation and further activities per the note          Natasha Means is a 23 year old, presenting for the following health issues:  Gastrointestinal Problem        7/19/2024     7:19 AM   Additional Questions   Roomed by Grace     History of Present Illness       Reason for visit:  Potential IBS and painful stools    She eats 2-3 servings of fruits and vegetables daily.She consumes 0 sweetened beverage(s) daily.She exercises with enough effort to increase her heart rate 20 to 29 minutes per day.  She exercises with enough effort to increase her heart rate  "4 days per week.   She is taking medications regularly.     Concern - IBS Symptoms   Onset: More than 6  Months   Description: Constipation, Abdominal pain/cramping   Pain when passing stool   Shares that she had a visit in January with another provider and was diagnosed with hemorrhoids   Average stool past in 24 hours - 1   Intensity: moderate  Progression of Symptoms:  same  Accompanying Signs & Symptoms: None   Previous history of similar problem: None   Precipitating factors:        Worsened by: None   Alleviating factors:        Improved by: None   Therapies tried and outcome: Miralax     Epsom salt   Eating prunes   Diagnosed with hemorrhoids in January 2024.   Eliminated fast food and   60 oz of water daily.   No new medications.   Doing well mentally. Tends to become     Review of Systems  Constitutional, neuro, ENT, endocrine, pulmonary, cardiac, gastrointestinal, genitourinary, musculoskeletal, integument and psychiatric systems are negative, except as otherwise noted.      Objective    BP 99/68 (BP Location: Right arm, Patient Position: Sitting, Cuff Size: Adult Regular)   Pulse 62   Temp 97.3  F (36.3  C) (Temporal)   Resp 16   Ht 1.681 m (5' 6.2\")   Wt 53 kg (116 lb 12.8 oz)   LMP 07/01/2024 (Within Days)   SpO2 100%   Breastfeeding No   BMI 18.74 kg/m    Body mass index is 18.74 kg/m .  Physical Exam   GENERAL: alert and no distress  NECK: no adenopathy, no asymmetry, masses, or scars  RESP: lungs clear to auscultation - no rales, rhonchi or wheezes  CV: regular rate and rhythm, normal S1 S2, no S3 or S4, no murmur, click or rub, no peripheral edema  ABDOMEN: soft, nontender, no hepatosplenomegaly, no masses and bowel sounds normal  MS: no gross musculoskeletal defects noted, no edema    Results for orders placed or performed in visit on 07/19/24   Comprehensive metabolic panel (BMP + Alb, Alk Phos, ALT, AST, Total. Bili, TP)     Status: Normal   Result Value Ref Range    Sodium 142 135 - 145 " mmol/L    Potassium 4.1 3.4 - 5.3 mmol/L    Carbon Dioxide (CO2) 24 22 - 29 mmol/L    Anion Gap 11 7 - 15 mmol/L    Urea Nitrogen 12.0 6.0 - 20.0 mg/dL    Creatinine 0.76 0.51 - 0.95 mg/dL    GFR Estimate >90 >60 mL/min/1.73m2    Calcium 9.3 8.8 - 10.4 mg/dL    Chloride 107 98 - 107 mmol/L    Glucose 89 70 - 99 mg/dL    Alkaline Phosphatase 54 40 - 150 U/L    AST 21 0 - 45 U/L    ALT 10 0 - 50 U/L    Protein Total 7.1 6.4 - 8.3 g/dL    Albumin 4.6 3.5 - 5.2 g/dL    Bilirubin Total 0.8 <=1.2 mg/dL   CBC with platelets     Status: Normal   Result Value Ref Range    WBC Count 5.6 4.0 - 11.0 10e3/uL    RBC Count 3.99 3.80 - 5.20 10e6/uL    Hemoglobin 13.0 11.7 - 15.7 g/dL    Hematocrit 39.0 35.0 - 47.0 %    MCV 98 78 - 100 fL    MCH 32.6 26.5 - 33.0 pg    MCHC 33.3 31.5 - 36.5 g/dL    RDW 12.0 10.0 - 15.0 %    Platelet Count 271 150 - 450 10e3/uL   TSH with free T4 reflex     Status: Normal   Result Value Ref Range    TSH 1.23 0.30 - 4.20 uIU/mL   Tissue transglutaminase teresa IgA and IgG [[id number]]     Status: Normal   Result Value Ref Range    Tissue Transglutaminase Antibody IgA 0.7 <7.0 U/mL    Tissue Transglutaminase Antibody IgG <0.6 <7.0 U/mL   Endomysial Antibody IgA by IFA [[id number]]     Status: None   Result Value Ref Range    Endomysial Antibody IgA by IFA <1:10 <1:10           Signed Electronically by: Terence Hlil MD

## 2024-07-22 LAB
ENDOMYSIUM IGA TITR SER IF: NORMAL {TITER}
TTG IGA SER-ACNC: 0.7 U/ML
TTG IGG SER-ACNC: <0.6 U/ML

## 2025-02-12 NOTE — PROGRESS NOTES
[facility] Dermatology Note  Encounter Date: Feb 13, 2025  Office Visit     Dermatology Problem List:  1. Acne Vulgaris  Current: BPO, clinda lotion, tretinoin 0.025%  Future: spironolactone, doxy   ____________________________________________    Assessment & Plan:     #Acne Vulgaris  Mild comedonal acne limited to face, flares with menses. Discussed etiology, tx options. Planning to start with topicals. Discussed bleaching side effect of BPO, tretinoin drying, and impt of co use of bpo and clinda to avoid resistance. Can consider spironolactone or doxycycline in the future.  -BPO wash daily   -Clindamycin Lotion BID  -Tretinoin 0.025% cream at bedtime as tolerated       Procedures Performed:   none    Follow-up: 5-6 Months, sooner prn    Staff and Resident:     Kanu Patrick MD  Internal Medicine-Dermatology PGY-3    Staff: Nicole    ____________________________________________    CC: No chief complaint on file.    HPI:  Ms. Clary Sigala is a(n) 24 year old female who presents today as a new patient for acne    -Used to be on the pill but went off. Noted flaring on cheeks, chin. Uses cerave and sensitive skin care products. Used   -Areas involved: face, rarely chest. No back armpits groin or buttocks  -worsened by menses: yes  -today is an OK day     Patient is otherwise feeling well, without additional skin concerns.    Labs Reviewed:  Creatinine normal     Physical Exam:  Vitals: There were no vitals taken for this visit.  SKIN: Acne exam, which includes the face, neck, upper central chest, and upper central back was performed.  -few acneiform papules and comedones on nose, cheeks with hyperpigmented macules c/w PIH  -no significant scarring appreciated   - No other lesions of concern on areas examined.     Medications:  Current Outpatient Medications   Medication Sig Dispense Refill    linaclotide (LINZESS) 72 MCG capsule Take 1 capsule (72 mcg) by mouth every morning (before breakfast)  30 capsule 2    norethindrone (MICRONOR) 0.35 MG tablet Take 1 tablet (0.35 mg) by mouth daily (Patient not taking: Reported on 7/19/2024) 84 tablet 3     No current facility-administered medications for this visit.      Past Medical History:   Patient Active Problem List   Diagnosis    Allergic rhinitis    Chronic pain of both knees    Raynaud's phenomenon without gangrene    Snapping hip syndrome    Migraine with aura and without status migrainosus, not intractable     No past medical history on file.    CC Referred Self, MD  No address on file on close of this encounter.

## 2025-02-13 ENCOUNTER — OFFICE VISIT (OUTPATIENT)
Dept: DERMATOLOGY | Facility: CLINIC | Age: 25
End: 2025-02-13
Payer: COMMERCIAL

## 2025-02-13 DIAGNOSIS — L70.0 ACNE VULGARIS: Primary | ICD-10-CM

## 2025-02-13 RX ORDER — CLINDAMYCIN PHOSPHATE 10 UG/ML
LOTION TOPICAL 2 TIMES DAILY
Qty: 60 ML | Refills: 11 | Status: SHIPPED | OUTPATIENT
Start: 2025-02-13

## 2025-02-13 RX ORDER — TRETINOIN 0.25 MG/G
CREAM TOPICAL
Qty: 45 G | Refills: 5 | Status: SHIPPED | OUTPATIENT
Start: 2025-02-13 | End: 2025-02-19

## 2025-02-13 ASSESSMENT — PAIN SCALES - GENERAL: PAINLEVEL_OUTOF10: NO PAIN (0)

## 2025-02-13 NOTE — LETTER
2/13/2025       RE: Clary Sigala  [id number]  [facility] [address]     Dear Colleague,    Thank you for referring your patient, Clary Sigala, to the [facility] DERMATOLOGY CLINIC [place] at [facility]. Please see a copy of my visit note below.    [facility] Dermatology Note  Encounter Date: Feb 13, 2025  Office Visit     Dermatology Problem List:  1. Acne Vulgaris  Current: BPO, clinda lotion, tretinoin 0.025%  Future: spironolactone, doxy   ____________________________________________    Assessment & Plan:     #Acne Vulgaris  Mild comedonal acne limited to face, flares with menses. Discussed etiology, tx options. Planning to start with topicals. Discussed bleaching side effect of BPO, tretinoin drying, and impt of co use of bpo and clinda to avoid resistance. Can consider spironolactone or doxycycline in the future.  -BPO wash daily   -Clindamycin Lotion BID  -Tretinoin 0.025% cream at bedtime as tolerated       Procedures Performed:   none    Follow-up: 5-6 Months, sooner prn    Staff and Resident:     Kanu Patrick MD  Internal Medicine-Dermatology PGY-3    Staff: Nicole    ____________________________________________    CC: No chief complaint on file.    HPI:  Ms. Clary Sigala is a(n) 24 year old female who presents today as a new patient for acne    -Used to be on the pill but went off. Noted flaring on cheeks, chin. Uses cerave and sensitive skin care products. Used   -Areas involved: face, rarely chest. No back armpits groin or buttocks  -worsened by menses: yes  -today is an OK day     Patient is otherwise feeling well, without additional skin concerns.    Labs Reviewed:  Creatinine normal     Physical Exam:  Vitals: There were no vitals taken for this visit.  SKIN: Acne exam, which includes the face, neck, upper central chest, and upper central back was performed.  -few acneiform papules and comedones  on nose, cheeks with hyperpigmented macules c/w PIH  -no significant scarring appreciated   - No other lesions of concern on areas examined.     Medications:  Current Outpatient Medications   Medication Sig Dispense Refill     linaclotide (LINZESS) 72 MCG capsule Take 1 capsule (72 mcg) by mouth every morning (before breakfast) 30 capsule 2     norethindrone (MICRONOR) 0.35 MG tablet Take 1 tablet (0.35 mg) by mouth daily (Patient not taking: Reported on 7/19/2024) 84 tablet 3     No current facility-administered medications for this visit.      Past Medical History:   Patient Active Problem List   Diagnosis     Allergic rhinitis     Chronic pain of both knees     Raynaud's phenomenon without gangrene     Snapping hip syndrome     Migraine with aura and without status migrainosus, not intractable     No past medical history on file.    CC Referred Self, MD  No address on file on close of this encounter.      I talked with and examined Clary Sigala and I agree with the assessment and the plan. FLACA Rivera MD.      Again, thank you for allowing me to participate in the care of your patient.      Sincerely,    Santhosh Patrick MD

## 2025-02-13 NOTE — NURSING NOTE
Dermatology Rooming Note    Clary Sigala's goals for this visit include:   Chief Complaint   Patient presents with    Derm Problem     Clary is here today to discuss acne treatment options for the face     Daniella BUSBY CMA

## 2025-02-13 NOTE — PROGRESS NOTES
I talked with and examined Clary Sigala and I agree with the assessment and the plan. FLACA Rivera MD.

## 2025-02-13 NOTE — PATIENT INSTRUCTIONS
Great to meet you!     Start using the benzoyl peroxide wash daily. Rinse thoroughly as this can bleach fabric but not skin.   Resume the clindamycin and make sure to use the BPO wash before to prevent resistance  Start applying a pea sized amount of tretinoin to the entire face every third night for a week, then every other night for a week, then every night as tolerated. This can be drying, so apply a moisturizer after.     We will check in in 5-6 months. Send a [employer] message sooner if anything comes up before then!

## 2025-02-16 ENCOUNTER — TELEPHONE (OUTPATIENT)
Dept: DERMATOLOGY | Facility: CLINIC | Age: 25
End: 2025-02-16
Payer: COMMERCIAL

## 2025-02-16 DIAGNOSIS — L70.0 ACNE VULGARIS: ICD-10-CM

## 2025-02-16 NOTE — TELEPHONE ENCOUNTER
Disp Refills Start End MAT    tretinoin (RETIN-A) 0.025 % external cream 45 g 5 2/13/2025 -- No   Sig: Apply a pea sized amount to the entire face every third night for one week, then every other night for one week, then every week as tolerated   Sent to pharmacy as: Tretinoin 0.025 % External Cream (RETIN-A)

## 2025-02-16 NOTE — TELEPHONE ENCOUNTER
Pharmacy requesting clarification on tretinoin cream, specifically if final dose is daily or weekly.

## 2025-02-16 NOTE — TELEPHONE ENCOUNTER
Called pharmacy, on hold for over 45 minutes, call was answered by pharmacy then hung up on pharmacy's end.     Attempted to call pharmacy back and same issue occurred.    Will attempt to call pharmacy back at later date.    Linwood Marcus, EMT

## 2025-02-16 NOTE — TELEPHONE ENCOUNTER
2/13/2025 Office Visit:  Instructions    Great to meet you!      Start using the benzoyl peroxide wash daily. Rinse thoroughly as this can bleach fabric but not skin.   Resume the clindamycin and make sure to use the BPO wash before to prevent resistance  Start applying a pea sized amount of tretinoin to the entire face every third night for a week, then every other night for a week, then every night as tolerated. This can be drying, so apply a moisturizer after.      We will check in in 5-6 months. Send a [employer] message sooner if anything comes up before then!

## 2025-02-18 NOTE — TELEPHONE ENCOUNTER
Called pharmacy, on hold for over an hour, unable to stay on phone any longer and hung up.    Linwood Marcus, EMT

## 2025-02-19 RX ORDER — TRETINOIN 0.25 MG/G
CREAM TOPICAL
Qty: 45 G | Refills: 5 | Status: SHIPPED | OUTPATIENT
Start: 2025-02-19

## 2025-04-04 SDOH — HEALTH STABILITY: PHYSICAL HEALTH: ON AVERAGE, HOW MANY MINUTES DO YOU ENGAGE IN EXERCISE AT THIS LEVEL?: 40 MIN

## 2025-04-04 SDOH — HEALTH STABILITY: PHYSICAL HEALTH: ON AVERAGE, HOW MANY DAYS PER WEEK DO YOU ENGAGE IN MODERATE TO STRENUOUS EXERCISE (LIKE A BRISK WALK)?: 5 DAYS

## 2025-04-04 ASSESSMENT — SOCIAL DETERMINANTS OF HEALTH (SDOH): HOW OFTEN DO YOU GET TOGETHER WITH FRIENDS OR RELATIVES?: TWICE A WEEK

## 2025-04-09 ENCOUNTER — OFFICE VISIT (OUTPATIENT)
Dept: FAMILY MEDICINE | Facility: CLINIC | Age: 25
End: 2025-04-09
Payer: COMMERCIAL

## 2025-04-09 VITALS
OXYGEN SATURATION: 96 % | WEIGHT: 123 LBS | BODY MASS INDEX: 19.77 KG/M2 | HEIGHT: 66 IN | RESPIRATION RATE: 16 BRPM | SYSTOLIC BLOOD PRESSURE: 117 MMHG | HEART RATE: 65 BPM | DIASTOLIC BLOOD PRESSURE: 76 MMHG | TEMPERATURE: 98 F

## 2025-04-09 DIAGNOSIS — Z00.00 ENCOUNTER FOR PREVENTATIVE ADULT HEALTH CARE EXAMINATION: Primary | ICD-10-CM

## 2025-04-09 DIAGNOSIS — Z12.4 CERVICAL CANCER SCREENING: ICD-10-CM

## 2025-04-09 DIAGNOSIS — Z11.3 ROUTINE SCREENING FOR STI (SEXUALLY TRANSMITTED INFECTION): ICD-10-CM

## 2025-04-09 PROCEDURE — 3074F SYST BP LT 130 MM HG: CPT | Performed by: FAMILY MEDICINE

## 2025-04-09 PROCEDURE — 1126F AMNT PAIN NOTED NONE PRSNT: CPT | Performed by: FAMILY MEDICINE

## 2025-04-09 PROCEDURE — 87491 CHLMYD TRACH DNA AMP PROBE: CPT | Performed by: FAMILY MEDICINE

## 2025-04-09 PROCEDURE — 99395 PREV VISIT EST AGE 18-39: CPT | Performed by: FAMILY MEDICINE

## 2025-04-09 PROCEDURE — 3078F DIAST BP <80 MM HG: CPT | Performed by: FAMILY MEDICINE

## 2025-04-09 PROCEDURE — 87591 N.GONORRHOEAE DNA AMP PROB: CPT | Performed by: FAMILY MEDICINE

## 2025-04-09 ASSESSMENT — PAIN SCALES - GENERAL: PAINLEVEL_OUTOF10: NO PAIN (0)

## 2025-04-09 NOTE — PROGRESS NOTES
Preventive Care Visit  [facility]  Terence Hill MD, Family Medicine  Apr 9, 2025      Assessment & Plan     Clary was seen today for physical.    Diagnoses and all orders for this visit:    Encounter for preventative adult health care examination  Labs were completed last year.  Patient does not desire to get labs today.    Cervical cancer screening  -     Pap Screen Only - Recommended Age 21 - 24 Years    Routine screening for STI (sexually transmitted infection)  -     Chlamydia trachomatis/Neisseria gonorrhoeae by PCR- VAGINAL SELF-SWAB; Future    Other orders  -     REVIEW OF HEALTH MAINTENANCE PROTOCOL ORDERS        Health Maintenance Reviewed:  Reviewed: Up-to-date.  Follow-up:            Patient has been advised of split billing requirements and indicates understanding: N/A        Counseling  Appropriate preventive services were addressed with this patient via screening, questionnaire, or discussion as appropriate for fall prevention, nutrition, physical activity, Tobacco-use cessation, social engagement, weight loss and cognition.  Checklist reviewing preventive services available has been given to the patient.  Reviewed patient's diet, addressing concerns and/or questions.       Subjective   Clary is a 24 year old, presenting for the following:  Physical        4/9/2025     3:32 PM   Additional Questions   Roomed by Naif CUEVAS          [facility]  Advance Care Planning  Patient does not have a Health Care Directive: Discussed advance care planning with patient; information given to patient to review.      4/4/2025   General Health   How would you rate your overall physical health? Excellent   Feel stress (tense, anxious, or unable to sleep) Only a little   (!) STRESS CONCERN      4/4/2025   Nutrition   Three or more servings of calcium each day? (!) NO   Diet: Regular (no restrictions)   How many servings of fruit and vegetables per day? (!) 2-3   How many sweetened beverages each day?  "0-1         4/4/2025   Exercise   Days per week of moderate/strenous exercise 5 days   Average minutes spent exercising at this level 40 min         4/4/2025   Social Factors   Frequency of gathering with friends or relatives Twice a week   Worry food won't last until get money to buy more No   Food not last or not have enough money for food? No   Do you have housing? (Housing is defined as stable permanent housing and does not include staying ouside in a car, in a tent, in an abandoned building, in an overnight shelter, or couch-surfing.) Yes   Are you worried about losing your housing? No   Lack of transportation? No   Unable to get utilities (heat,electricity)? No         4/4/2025   Dental   Dentist two times every year? Yes           2/19/2024   TB Screening   Were you born outside of the [place]? (!) YES             4/4/2025   Substance Use   Alcohol more than 3/day or more than 7/wk No   Do you use any other substances recreationally? No     Social History     Tobacco Use    Smoking status: Never     Passive exposure: Never    Smokeless tobacco: Never   Vaping Use    Vaping status: Never Used   Substance Use Topics    Alcohol use: Not Currently    Drug use: Never           4/4/2025   STI Screening   New sexual partner(s) since last STI/HIV test? (!) YES      History of abnormal Pap smear: No - age 21-29 PAP every 3 years recommended             4/4/2025   Contraception/Family Planning   Questions about contraception or family planning (!) YES         Reviewed and updated as needed this visit by Provider   Tobacco                    Review of Systems  Constitutional, neuro, ENT, endocrine, pulmonary, cardiac, gastrointestinal, genitourinary, musculoskeletal, integument and psychiatric systems are negative, except as otherwise noted.     Objective    Exam  /76   Pulse 65   Temp 98  F (36.7  C) (Temporal)   Resp 16   Ht 1.688 m (5' 6.45\")   Wt 55.8 kg (123 lb)   LMP 03/25/2025 (Exact Date)   SpO2 96%   " "BMI 19.58 kg/m     Estimated body mass index is 19.58 kg/m  as calculated from the following:    Height as of this encounter: 1.688 m (5' 6.45\").    Weight as of this encounter: 55.8 kg (123 lb).    Physical Exam  GENERAL: alert and no distress  EYES: Eyes grossly normal to inspection, PERRL and conjunctivae and sclerae normal  HENT: ear canals and TM's normal, nose and mouth without ulcers or lesions  NECK: no adenopathy, no asymmetry, masses, or scars  RESP: lungs clear to auscultation - no rales, rhonchi or wheezes  CV: regular rate and rhythm, normal S1 S2, no S3 or S4, no murmur, click or rub, no peripheral edema  ABDOMEN: soft, nontender, no hepatosplenomegaly, no masses and bowel sounds normal  MS: no gross musculoskeletal defects noted, no edema  SKIN: no suspicious lesions or rashes  NEURO: Normal strength and tone, mentation intact and speech normal  PSYCH: mentation appears normal, affect normal/bright        Signed Electronically by: Terence Hill MD    "

## 2025-04-10 LAB
C TRACH DNA SPEC QL PROBE+SIG AMP: NEGATIVE
N GONORRHOEA DNA SPEC QL NAA+PROBE: NEGATIVE
SPECIMEN TYPE: NORMAL

## 2025-04-14 LAB
BKR LAB AP GYN ADEQUACY: NORMAL
BKR LAB AP GYN INTERPRETATION: NORMAL
BKR LAB AP HPV REFLEX: NO
BKR LAB AP LMP: NORMAL
BKR LAB AP PREVIOUS ABNORMAL: NORMAL
PATH REPORT.COMMENTS IMP SPEC: NORMAL
PATH REPORT.COMMENTS IMP SPEC: NORMAL
PATH REPORT.RELEVANT HX SPEC: NORMAL

## 2025-06-15 NOTE — PROGRESS NOTES
[facility] Dermatology Note  Encounter Date: Jun 19, 2025  Office Visit     Dermatology Problem List:  1. Acne Vulgaris  Current: BPO, clinda lotion, tretinoin 0.025%, spironolactone  Future: doxycycline; restart of hormonal birth control (went off it in 2024, but has history of migraine with aura)  MedHx: migraines with aura, hepatitis B (cleared when she was young)  ____________________________________________    Assessment & Plan:   # acne vulgaris, chronic/flaring; with hormonal component  Discussed etiology in depth. In shared decision making opted to start spironolactone today in addition to the topicals below:  - RESTART tretinoin 0.025% at bedtime (discussed sandwich method, moisturizer use)  - CONTINUE BPO, clindamycin  - START spironolactone (Cr 7/2024 WNL, counseled on potential side effects/teratogenicity)  Start spironolactone 50 mg once daily for 1 week, then increase to 100 mg daily as tolerated. Counseled this is a medication used in high doses for blood pressure but in lower doses for acne due to its anti-male hormone effects. Main side effects are dizziness, menstrual spotting, breast tenderness, high potassium, and male fetus feminization.   - Will consider restart of birth control with her primary care physician.     Procedures Performed: None    Follow-up: 3-4 months for acne, prn for new or changing lesions    Staff and Resident:     Staffed with Dr. CHHAYA Licona.     Douglas Solorzano MD  Dermatology Resident  ____________________________________________    CC: Derm Problem (Acne vulgaris - Clary states there has been improvement )    HPI:  Ms. Clary Sigala is a(n) 24 year old female who presents today as a return patient for acne vulgaris. Using BPO wash and clindamycin. Used the tretinoin for 2 weeks and was not effective. Was a little drying.   Used to take the birth control pill, used to help with her acne, but she felt fatigued. Had migraines with aura. Also has had  hepatitis B and a kidney infection in the past. Patient is otherwise feeling well, without additional skin concerns.     Labs Reviewed:  Reviewed CMP from 2024.     Physical Exam:  Vitals: There were no vitals taken for this visit.  SKIN: Acne exam, which includes the face, neck, upper central chest, and upper central back was performed.  - Few acneiform papules and brown macules on the central face.  - No other lesions of concern on areas examined.     Medications:  Current Outpatient Medications   Medication Sig Dispense Refill    benzoyl peroxide 5 % external liquid Use as a face wash daily. Rinse thoroughly to prevent bleaching of fabric. 236 mL 11    clindamycin (CLEOCIN T) 1 % external lotion Apply topically 2 times daily. 60 mL 11    spironolactone (ALDACTONE) 50 MG tablet START sprinolactone 50 mg daily for 1 week (1 pill), then increase to 100 mg (2 pills) 180 tablet 1    tretinoin (RETIN-A) 0.025 % external cream Apply a pea sized amount to the entire face every third night for one week, then every other night for one week, then every night as tolerated 45 g 5    norethindrone (MICRONOR) 0.35 MG tablet Take 1 tablet (0.35 mg) by mouth daily (Patient not taking: Reported on 6/19/2025) 84 tablet 3     No current facility-administered medications for this visit.      Past Medical History:   Patient Active Problem List   Diagnosis    Allergic rhinitis    Chronic pain of both knees    Raynaud's phenomenon without gangrene    Snapping hip syndrome    Migraine with aura and without status migrainosus, not intractable     No past medical history on file.    CC No referring provider defined for this encounter. on close of this encounter.

## 2025-06-19 ENCOUNTER — OFFICE VISIT (OUTPATIENT)
Dept: DERMATOLOGY | Facility: CLINIC | Age: 25
End: 2025-06-19
Payer: COMMERCIAL

## 2025-06-19 DIAGNOSIS — L70.0 ACNE VULGARIS: ICD-10-CM

## 2025-06-19 RX ORDER — SPIRONOLACTONE 50 MG/1
100 TABLET, FILM COATED ORAL DAILY
Qty: 180 TABLET | Refills: 1 | Status: SHIPPED | OUTPATIENT
Start: 2025-06-19 | End: 2025-06-19

## 2025-06-19 RX ORDER — SPIRONOLACTONE 50 MG/1
TABLET, FILM COATED ORAL
Qty: 180 TABLET | Refills: 1 | Status: SHIPPED | OUTPATIENT
Start: 2025-06-19

## 2025-06-19 ASSESSMENT — PAIN SCALES - GENERAL: PAINLEVEL_OUTOF10: NO PAIN (0)

## 2025-06-19 NOTE — NURSING NOTE
Dermatology Rooming Note    Clary Sigala's goals for this visit include:   Chief Complaint   Patient presents with    Derm Problem     Acne vulgaris - Clary states there has been improvement      Daniella BUSBY CMA

## 2025-06-19 NOTE — PATIENT INSTRUCTIONS
Start spironolactone 50 mg daily for 1 week, then increase to 100 mg daily if tolerated.    This is a medication used in high doses for blood pressure but in lower doses for acne due to its anti-male hormone effects. Main side effects are dizziness, menstrual spotting, breast tenderness, high potassium, and if you were to become pregnant, a male fetus could become a female fetus. It is very important to avoid pregnancy while on this medication and to stop it immediately if you do become pregnant.

## 2025-06-19 NOTE — LETTER
6/19/2025       RE: Clary Sigala  [id number]  [facility] [address]     Dear Colleague,    Thank you for referring your patient, Clary Sigala, to the [facility] DERMATOLOGY CLINIC [place] at [facility]. Please see a copy of my visit note below.    [facility] Dermatology Note  Encounter Date: Jun 19, 2025  Office Visit     Dermatology Problem List:  1. Acne Vulgaris  Current: BPO, clinda lotion, tretinoin 0.025%, spironolactone  Future: doxycycline; restart of hormonal birth control (went off it in 2024, but has history of migraine with aura)  MedHx: migraines with aura, hepatitis B (cleared when she was young)  ____________________________________________    Assessment & Plan:   # acne vulgaris, chronic/flaring; with hormonal component  Discussed etiology in depth. In shared decision making opted to start spironolactone today in addition to the topicals below:  - RESTART tretinoin 0.025% at bedtime (discussed sandwich method, moisturizer use)  - CONTINUE BPO, clindamycin  - START spironolactone (Cr 7/2024 WNL, counseled on potential side effects/teratogenicity)  Start spironolactone 50 mg once daily for 1 week, then increase to 100 mg daily as tolerated. Counseled this is a medication used in high doses for blood pressure but in lower doses for acne due to its anti-male hormone effects. Main side effects are dizziness, menstrual spotting, breast tenderness, high potassium, and male fetus feminization.   - Will consider restart of birth control with her primary care physician.     Procedures Performed: None    Follow-up: 3-4 months for acne, prn for new or changing lesions    Staff and Resident:     Staffed with Dr. CHHAYA Licona.     Douglas Solorzano MD  Dermatology Resident  ____________________________________________    CC: Derm Problem (Acne vulgaris - Clary states there has been improvement )    HPI:  Ms. Clary Pedersen  Jovon is a(n) 24 year old female who presents today as a return patient for acne vulgaris. Using BPO wash and clindamycin. Used the tretinoin for 2 weeks and was not effective. Was a little drying.   Used to take the birth control pill, used to help with her acne, but she felt fatigued. Had migraines with aura. Also has had hepatitis B and a kidney infection in the past. Patient is otherwise feeling well, without additional skin concerns.     Labs Reviewed:  Reviewed CMP from 2024.     Physical Exam:  Vitals: There were no vitals taken for this visit.  SKIN: Acne exam, which includes the face, neck, upper central chest, and upper central back was performed.  - Few acneiform papules and brown macules on the central face.  - No other lesions of concern on areas examined.     Medications:  Current Outpatient Medications   Medication Sig Dispense Refill     benzoyl peroxide 5 % external liquid Use as a face wash daily. Rinse thoroughly to prevent bleaching of fabric. 236 mL 11     clindamycin (CLEOCIN T) 1 % external lotion Apply topically 2 times daily. 60 mL 11     spironolactone (ALDACTONE) 50 MG tablet START sprinolactone 50 mg daily for 1 week (1 pill), then increase to 100 mg (2 pills) 180 tablet 1     tretinoin (RETIN-A) 0.025 % external cream Apply a pea sized amount to the entire face every third night for one week, then every other night for one week, then every night as tolerated 45 g 5     norethindrone (MICRONOR) 0.35 MG tablet Take 1 tablet (0.35 mg) by mouth daily (Patient not taking: Reported on 6/19/2025) 84 tablet 3     No current facility-administered medications for this visit.      Past Medical History:   Patient Active Problem List   Diagnosis     Allergic rhinitis     Chronic pain of both knees     Raynaud's phenomenon without gangrene     Snapping hip syndrome     Migraine with aura and without status migrainosus, not intractable     No past medical history on file.    CC No referring provider  defined for this encounter. on close of this encounter.    Attestation signed by Kendrick Licona MD at 6/26/2025  4:29 PM:  I have personally examined this patient and agree with the resident doctor's documentation and plan of care. I have reviewed and amended the resident's note. The documentation accurately reflects my clinical observations, diagnoses, treatment and follow-up plans.     Kendrick Licona MD  Dermatology Staff      Again, thank you for allowing me to participate in the care of your patient.      Sincerely,    Douglas Solorzano MD

## 2025-08-18 ENCOUNTER — OFFICE VISIT (OUTPATIENT)
Dept: MIDWIFE SERVICES | Facility: CLINIC | Age: 25
End: 2025-08-18
Payer: COMMERCIAL

## 2025-08-18 VITALS
SYSTOLIC BLOOD PRESSURE: 100 MMHG | BODY MASS INDEX: 20.23 KG/M2 | HEIGHT: 66 IN | DIASTOLIC BLOOD PRESSURE: 66 MMHG | HEART RATE: 68 BPM | OXYGEN SATURATION: 98 % | WEIGHT: 125.9 LBS

## 2025-08-18 DIAGNOSIS — Z30.430 ENCOUNTER FOR INSERTION OF INTRAUTERINE CONTRACEPTIVE DEVICE: Primary | ICD-10-CM

## 2025-08-18 DIAGNOSIS — Z30.430 ENCOUNTER FOR INSERTION OF INTRAUTERINE CONTRACEPTIVE DEVICE (IUD): ICD-10-CM

## 2025-08-18 LAB — HCG UR QL: NEGATIVE

## 2025-08-18 PROCEDURE — 3078F DIAST BP <80 MM HG: CPT | Performed by: ADVANCED PRACTICE MIDWIFE

## 2025-08-18 PROCEDURE — 3074F SYST BP LT 130 MM HG: CPT | Performed by: ADVANCED PRACTICE MIDWIFE

## 2025-08-18 PROCEDURE — 58300 INSERT INTRAUTERINE DEVICE: CPT | Performed by: ADVANCED PRACTICE MIDWIFE

## 2025-08-18 PROCEDURE — 81025 URINE PREGNANCY TEST: CPT | Performed by: ADVANCED PRACTICE MIDWIFE
